# Patient Record
(demographics unavailable — no encounter records)

---

## 2025-01-16 NOTE — PHYSICAL EXAM
[Normal Appearance] : normal appearance [Well Groomed] : well groomed [General Appearance - In No Acute Distress] : no acute distress [Respiration, Rhythm And Depth] : normal respiratory rhythm and effort [Exaggerated Use Of Accessory Muscles For Inspiration] : no accessory muscle use [Urethral Meatus] : meatus normal [Penis Abnormality] : normal circumcised penis [Scrotum] : the scrotum was normal [Testes Tenderness] : no tenderness of the testes [Testes Mass (___cm)] : there were no testicular masses [Normal Station and Gait] : the gait and station were normal for the patient's age [] : no rash [No Focal Deficits] : no focal deficits [Oriented To Time, Place, And Person] : oriented to person, place, and time [Affect] : the affect was normal [Mood] : the mood was normal [de-identified] : B/L 20cc testes, no varicoceles B/L. very difficult exam due to pateitn compliance. palp vas right side

## 2025-01-16 NOTE — HISTORY OF PRESENT ILLNESS
[FreeTextEntry1] : NADINE ZAYAS is a 22 year M presenting on 01/16/2025 No PMH No meds Referred by: Dr Vences from [facility]   2 years. No prior pregnancy. Chelsea Menjivar, age 25. Still process of female eval. Not aware of any sexual dysfunction. Did recent hormonal bloodwork w/ Ru who said numbers ok, FSH slightly lower than last time. No SA done. No tobacco or MJ  8/2023: LH 4.3 FSH 2.0 free T 10.2  10/2024 E2 <25 FSH 1.8 LH 3.4   wife with low AMH 2 attempts IVF SA x 1 avaialble 2/2024 0.25 ml/34.5 mil/ml/58% motility

## 2025-01-16 NOTE — ASSESSMENT
[FreeTextEntry1] : 21yo M hypogonadism low ejaculate volume having difficulty with interrcourse in early AM as part of IVF attempts very difficult exam due to anxiety will avoid TRUS for now trial viagra 100 repeat attempt IVF if remains low ejac volume for TRUS

## 2025-01-16 NOTE — END OF VISIT
[FreeTextEntry3] :  I, Dr. Godoy, personally performed the evaluation and management (E/M) services for this new patient.  That E/M includes conducting the clinically appropriate initial history &/or exam, assessing all conditions, and establishing the plan of care.  Today, my TIN, Aqdot Juan, was here to observe my evaluation and management service for this patient & follow plan of care established by me going forward.

## 2025-01-16 NOTE — LETTER BODY
[Dear  ___] : Dear  [unfilled], [FreeTextEntry2] : Olvin Hanley MD [facility] [address] [place] [address] [FreeTextEntry1] : I had the pleasure of seeing NADINE ZAYAS, a 22 year old man,  to your patient Chelsea Menjivar in the office in consultation today. Please see the attached note for full details.  Thank you very much for allowing me to participate in the care of this patient. If you have any questions please feel free to call me at any time.    Sincerely yours,    Sherif Godoy MD, RANJITH Director, Male Fertility and Microsurgery  of [facility]